# Patient Record
Sex: FEMALE | Race: WHITE
[De-identification: names, ages, dates, MRNs, and addresses within clinical notes are randomized per-mention and may not be internally consistent; named-entity substitution may affect disease eponyms.]

---

## 2017-08-16 ENCOUNTER — HOSPITAL ENCOUNTER (OUTPATIENT)
Dept: HOSPITAL 45 - C.LAB1850 | Age: 47
Discharge: HOME | End: 2017-08-16
Attending: NURSE PRACTITIONER
Payer: COMMERCIAL

## 2017-08-16 DIAGNOSIS — I10: ICD-10-CM

## 2017-08-16 DIAGNOSIS — Z13.220: Primary | ICD-10-CM

## 2017-08-16 DIAGNOSIS — R53.83: ICD-10-CM

## 2017-08-16 DIAGNOSIS — Z13.1: ICD-10-CM

## 2017-08-16 LAB
ANION GAP SERPL CALC-SCNC: 4 MMOL/L (ref 3–11)
APPEARANCE UR: CLEAR
BILIRUB UR-MCNC: (no result) MG/DL
BUN SERPL-MCNC: 15 MG/DL (ref 7–18)
BUN/CREAT SERPL: 15.5 (ref 10–20)
CALCIUM SERPL-MCNC: 9.4 MG/DL (ref 8.5–10.1)
CHLORIDE SERPL-SCNC: 105 MMOL/L (ref 98–107)
CHOLEST/HDLC SERPL: 3 {RATIO}
CO2 SERPL-SCNC: 26 MMOL/L (ref 21–32)
COLOR UR: YELLOW
CREAT SERPL-MCNC: 0.99 MG/DL (ref 0.6–1.2)
EOSINOPHIL NFR BLD AUTO: 234 K/UL (ref 130–400)
GLUCOSE SERPL-MCNC: 82 MG/DL (ref 70–99)
GLUCOSE UR QL: 58 MG/DL
HCT VFR BLD CALC: 44.7 % (ref 37–47)
KETONES UR QL STRIP: 99 MG/DL
MANUAL MICROSCOPIC REQUIRED?: NO
MCH RBC QN AUTO: 29.7 PG (ref 25–34)
MCHC RBC AUTO-ENTMCNC: 33.1 G/DL (ref 32–36)
MCV RBC AUTO: 89.8 FL (ref 80–100)
NITRITE UR QL STRIP: (no result)
NITRITE UR QL STRIP: 74 MG/DL (ref 0–150)
PH UR STRIP: 7 [PH] (ref 4.5–7.5)
PH UR: 172 MG/DL (ref 0–200)
PMV BLD AUTO: 11.9 FL (ref 7.4–10.4)
POTASSIUM SERPL-SCNC: 3.8 MMOL/L (ref 3.5–5.1)
RBC # BLD AUTO: 4.98 M/UL (ref 4.2–5.4)
REVIEW REQ?: NO
SODIUM SERPL-SCNC: 135 MMOL/L (ref 136–145)
SP GR UR STRIP: 1.01 (ref 1–1.03)
TSH SERPL-ACNC: 1.05 UIU/ML (ref 0.3–4.5)
URINE EPITHELIAL CELL AUTO: (no result) /LPF (ref 0–5)
UROBILINOGEN UR-MCNC: (no result) MG/DL
VERY LOW DENSITY LIPOPROT CALC: 15 MG/DL
WBC # BLD AUTO: 8.59 K/UL (ref 4.8–10.8)

## 2018-03-30 ENCOUNTER — HOSPITAL ENCOUNTER (OUTPATIENT)
Dept: HOSPITAL 45 - X.SURG | Age: 48
Discharge: HOME | End: 2018-03-30
Attending: SURGERY
Payer: COMMERCIAL

## 2018-03-30 VITALS
HEIGHT: 65.98 IN | HEIGHT: 65.98 IN | BODY MASS INDEX: 25.28 KG/M2 | WEIGHT: 157.32 LBS | WEIGHT: 157.32 LBS | BODY MASS INDEX: 25.28 KG/M2

## 2018-03-30 VITALS — DIASTOLIC BLOOD PRESSURE: 85 MMHG | OXYGEN SATURATION: 99 % | SYSTOLIC BLOOD PRESSURE: 151 MMHG | HEART RATE: 65 BPM

## 2018-03-30 VITALS — TEMPERATURE: 98.42 F

## 2018-03-30 DIAGNOSIS — F41.8: ICD-10-CM

## 2018-03-30 DIAGNOSIS — Z79.899: ICD-10-CM

## 2018-03-30 DIAGNOSIS — L72.11: Primary | ICD-10-CM

## 2018-03-30 DIAGNOSIS — Z98.51: ICD-10-CM

## 2018-03-30 DIAGNOSIS — Z83.49: ICD-10-CM

## 2018-03-30 DIAGNOSIS — Z82.49: ICD-10-CM

## 2018-03-30 DIAGNOSIS — K21.9: ICD-10-CM

## 2018-03-30 DIAGNOSIS — I10: ICD-10-CM

## 2018-03-30 NOTE — [facility] OPERATIVE REPORT
Operative Report


Operative Date


Mar 30, 2018.





Pre-Operative Diagnosis





Scalp masses X2





Post-Operative Diagnosis





Same as pre-op





Procedure(s) Performed





Excision Scalp Masses X2 (1cm and 1cm)





Surgeon








Assistant Surgeon(s)


None





Estimated Blood Loss


1ML





Findings


1cm pilar cysts excised, superficial to galea





Specimens





A.Midline scalp mass





B.Right side scalp mass





Drains


None





Anesthesia Type


MAC





Complication(s)


none





Disposition


no


Recovery Room / PACU





Indications


47-year-old female with soft tissue masses to her scalp, likely pilar cysts.  

Plan for excision of scalp masses 2.  The risks of the procedure were discussed

, all questions were answered, and the patient agreed to proceed with surgery 

as planned.





Description of Procedure


The patient was properly identified, consented, and taken to the operating room 

where she was placed in the supine position.  Moderate anesthesia care was 

induced.  SCDs and a safety belt were placed.  The areas overlying the mass is 

in the midline scalp and right temporal area and a small amount of hair shaved 

overlying the mass.  Surgical lube was used to keep the hair out of the 

surgical field.  The patient's scalp was prepped and draped in the standard 

sterile fashion.  Surgical timeout was performed and all parties were in 

agreement that this was the correct patient and procedure to be performed and 

we continued as planned.  





Local anesthetic was injected along the skin incision.  An elliptical incision 

was made overlying the mass in the midline scalp and deepened down through the 

subcutaneous tissue with electrocautery.  The mass was circumferentially 

dissected, excised, and passed off the table as specimen, and appear to be a 1 

cm pilar cyst that was superficial to the galea.  The wound was irrigated and 

hemostasis was confirmed.  The skin was closed with interrupted 4-0 Prolene 

simple suture.  Bacitracin was placed over the wound.  





Attention then turned to the right sided scalp mass.  An elliptical incision 

was made overlying the mass and encompassing the central punctum was clearly 

visible.  The mass was circumferentially dissected, excised, and passed off the 

table specimen, and appeared to be a 1 cm pilar cyst that was superficial to 

the galea was more inflamed than the other excision.  The wound was irrigated 

and hemostasis was confirmed.  The skin was closed with interrupted 4-0 Prolene 

simple sutures.  Bacitracin was placed over the wound.





Anesthesia was ceased in the operating room and taken to the PACU where she 

recovered without apparent incident.  All sponge, instrument and needle counts 

were correct at the conclusion of the procedure.  The patient tolerated the 

procedure well.


I attest to the content of the Intraoperative Record and any orders documented 

therein.  Any exceptions are noted below.

## 2018-03-30 NOTE — [facility] POST OPERATIVE BRIEF NOTE
Immediate Operative Summary


Operative Date


Mar 30, 2018.





Pre-Operative Diagnosis





Scalp masses X2





Post-Operative Diagnosis





Same as pre-op





Procedure(s) Performed





Excision Scalp Masses X2 (1cm and 1cm)





Surgeon








Assistant Surgeon(s)


None





Estimated Blood Loss


1ML





Findings


Consistent with Post-Op Diagnosis


1cm pilar cysts excised, superficial to galea





Specimens





A.Midline scalp mass





B.Right side scalp mass





Drains


None





Anesthesia Type


MAC





Complication(s)


none





Disposition


Accompanied Pt To Recovery:  no


Disposition:  Recovery Room / PACU

## 2018-03-30 NOTE — DISCHARGE INSTRUCTIONS
Discharge Instructions


Date of Service


Mar 30, 2018.





Visit


Reason for Visit:  Scalp Masses X2





Discharge


Discharge Diagnosis / Problem:  excision scalp mass





Discharge Goals


Goal(s):  Decrease discomfort





Activity Recommendations


Activity Limitations:  resume your previous activity


Shower/Bathe:  tomorrow





Anesthesia


.





Post Anesthesia Instructions:





If you have had General Anesthesia or IV Sedation:





*  Do not drive today.


*  Resume driving when surgeon permits.


*  Do not make important decisions or sign legal documents today.


*  Call surgeon for:





   1.  Temperature elevations greater than 101 degrees F.


   2.  Uncontrollable pain.


   3.  Excessive bleeding.


   4.  Persistent nausea and vomiting.


   5.  Medication intolerance (nausea, vomiting or rash).





*  For nausea and vomiting use only clear liquids such as: tea, soda, bouillon 

until nausea subsides, then gradually increase diet as tolerated.





*  If you have any concerns or questions, call your surgeon's office.  If 

physician is unavailable and it is an emergency, call 911 or go to the nearest 

emergency room.





.





Instructions / Follow-Up


Instructions / Follow-Up


Dr. Arreola in approx 1 week as planned or call [phone] if you do not already 

have an appt or have any questions


You may take ibuprofen 600 mg every 6 hours as needed for pain or Tylenol OTC 

as directed





Diet Recommendations


Recommended Home Diet:  no limitations





Pending Studies


Studies pending at discharge:  yes


List of pending studies:  


pathology





Medical Emergencies








.


Who to Call and When:





Medical Emergencies:  If at any time you feel your situation is an emergency, 

please call 911 immediately.





.





Non-Emergent Contact


Non-Emergency issues call your:  Surgeon


Call Non-Emergent contact if:  you have a fever, temperature is above 101.5, 

your pain is not controlled, wound has increased redness, you have any 

medication questions





.


.








"Provider Documentation" section prepared by Lewis Hutchison.








.

## 2018-05-24 ENCOUNTER — HOSPITAL ENCOUNTER (EMERGENCY)
Dept: HOSPITAL 45 - C.EDB | Age: 48
LOS: 1 days | Discharge: HOME | End: 2018-05-25
Payer: COMMERCIAL

## 2018-05-24 VITALS
WEIGHT: 163.14 LBS | BODY MASS INDEX: 26.22 KG/M2 | HEIGHT: 65.98 IN | HEIGHT: 65.98 IN | WEIGHT: 163.14 LBS | BODY MASS INDEX: 26.22 KG/M2

## 2018-05-24 VITALS — TEMPERATURE: 97.7 F
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
## 2018-05-24 DIAGNOSIS — Z79.899: ICD-10-CM

## 2018-05-24 DIAGNOSIS — M79.644: Primary | ICD-10-CM

## 2018-05-24 DIAGNOSIS — I10: ICD-10-CM

## 2018-05-24 NOTE — EMERGENCY ROOM VISIT NOTE
History


First contact with patient:  23:27


Chief Complaint:  HAND PAIN/INJURY


Stated Complaint:  RIGHT HAND MIDDLE FINGER INJURY





History of Present Illness


The patient is a 47 year old female who presents to the Emergency Room via 

private vehicle with complaints of "right hand, middle finger injury".  The 

patient states that she is right-hand dominant, and earlier today around 7:30 

AM she was on doing a bungee cord when she felt a pop in the right hand on the 

flexor aspect of the right third digit and then had pain radiating to the 

dorsal base.  She rates the pain as a 7/10.  She denies any numbness or 

tingling.  She notes that she tried to ice the region and took ibuprofen with 

minimal relief.





Review of Systems


A complete 6-point Review of Systems was discussed with the patient, with 

pertinent positives and negatives listed in the History of Present Illness. All 

remaining Review of Systems questions can be considered negative unless 

otherwise specified.





Past Medical/Surgical History


Medical Problems:


(1) Hx of migraines


(2) Hypertension


(3) Kidney stone








Family History





No pertinent family history





Social History


Smoking Status:  Never Smoker


Alcohol Use:  occasionally


Drug Use:  none


Marital Status:  , in relationship


Housing Status:  lives with family


Occupation Status:  employed





Current/Historical Medications


Scheduled


Amlodipine (Norvasc), 5 MG PO QAM


Bupropion (Wellbutrin Sr), 100 MG PO QAM


Topiramate (Topamax), 100 MG PO QPM


Topiramate (Topamax), 50 MG PO QAM





Physical Exam


Vital Signs











  Date Time  Temp Pulse Resp B/P (MAP) Pulse Ox O2 Delivery O2 Flow Rate FiO2


 


5/25/18 00:09  60 20 145/80 98 Room Air  


 


5/24/18 23:24 36.5 63 20 149/86 98 Room Air  











Physical Exam


VITAL SIGNS - Vital signs and nursing notes were reviewed.  Stable.  Afebrile.


GENERAL -47-year-old female appearing her stated age who is in no acute 

distress. Communicates well with provider and answers questions appropriately.


SKIN -no ecchymosis.  There is edema and erythema overlying the dorsal aspect 

of the right MCP joint.  No deformity noted.


EXTREMITIES -the patient's right hand is unremarkable to inspection other than 

edema and erythema overlying the right third dorsal aspect of the MCP joint.  

There is tenderness to palpation overlying the flexor aspect of the patient's 

right PIP and DIP joint extending to the volar MCP joint region.  Full strength 

with flexion and extension was elicited against resistance of the right third 

digit.  No strength deficit.  No palpable tear/rolled tendon or ligament.  No 

clicking.  No crepitus.  She is neurovascularly intact in this region with 

excellent distal capillary refill.





Medical Decision & Procedures


ER Provider


Diagnostic Interpretation:





R HAND MIN 3 VIEWS ROUTINE





CLINICAL HISTORY: Right hand pain     





COMPARISON: None.





DISCUSSION: No acute fractures or dislocations are visualized. There are no


erosive or destructive changes.    





IMPRESSION: No acute fractures. No evidence of erosive disease.











Electronically signed by:  Wei Ferreira M.D.


5/25/2018 6:50 AM





Dictated Date/Time:  5/25/2018 6:49 AM





Medical Decision


Patient was seen and evaluated as above in room be 11. Review was performed of 

nursing notes and vital signs. After obtaining a thorough history and physical 

examination the above work up was performed.  X-ray obtained secondary to her 

presentation with right third digit pain extending into the MCP joint.  This 

was found to be negative.  I suspect she likely is experiencing a tendon or 

ligamentous injury.  She will be placed in a finger splint with slight flexion 

secondary to the pain being on the flexor aspect.  She is to follow with 

orthopedics or return with worsening.  Work excuse provided.  The patient was 

educated upon management, had questions answered prior to discharge, and was 

discharged home in good condition.





In the evaluation and treatment of this patient, the following differential 

diagnoses were considered:


Finger Fracture, Finger Dislocation, Finger Sprain, Finger Contusion, Jersey 

Finger, or Mallet Finger.





Impression





 Primary Impression:  


 Finger pain, right





Departure Information


Dispostion


Home / Self-Care





Condition


GOOD





Referrals


Anisa Merino (PCP)








Yosvany Byers M.D.





Patient Instructions


[facility]





Additional Instructions





You have been treated in the Emergency Department for Finger Pain. 





For pain control, you can use the following over-the-counter medicines:





- Regular strength (325mg/tab) Tylenol (acetaminophen) 2 tabs every 4-6 hours 

as needed. Do not exceed 12 tablets in a 24 hour period. Avoid taking more than 

3 grams (3000 mg) of Tylenol per day. This includes any other sources of 

acetaminophen you may take on a regular basis.





- Regular strength (200 mg/tab) Advil (ibuprofen) 1-2 tabs every 4-6 hours as 

needed. Do not exceed a dose of 3200 mg per day.





If this is a recent injury (<24 hrs), ice can be applied to the area of pain 

for the first 3 days to help decrease pain and inflammation.





You have been provided the number for an Orthopaedic Surgeon. You should call 

this number as soon as possible to establish a follow-up visit from today's 

Emergency Department visit.





Keep the brace/splint in place until evaluated by Orthopedics.





Return to the Emergency Department if your current symptoms worsen despite 

treatment course outlined above, or if you develop any of the following symptoms

: intractable pain despite aforementioned treatment course or new onset of 

numbness or tingling of the fingers.

## 2018-05-25 VITALS — HEART RATE: 60 BPM | OXYGEN SATURATION: 98 % | SYSTOLIC BLOOD PRESSURE: 145 MMHG | DIASTOLIC BLOOD PRESSURE: 80 MMHG

## 2018-05-25 NOTE — DIAGNOSTIC IMAGING REPORT
R HAND MIN 3 VIEWS ROUTINE



CLINICAL HISTORY: Right hand pain     



COMPARISON: None.



DISCUSSION: No acute fractures or dislocations are visualized. There are no

erosive or destructive changes.    



IMPRESSION: No acute fractures. No evidence of erosive disease.







Electronically signed by:  Wei Ferreira M.D.

5/25/2018 6:50 AM



Dictated Date/Time:  5/25/2018 6:49 AM

## 2018-08-22 ENCOUNTER — HOSPITAL ENCOUNTER (OUTPATIENT)
Dept: HOSPITAL 45 - C.LAB1850 | Age: 48
Discharge: HOME | End: 2018-08-22
Attending: FAMILY MEDICINE
Payer: COMMERCIAL

## 2018-08-22 DIAGNOSIS — Z13.1: Primary | ICD-10-CM

## 2018-08-22 DIAGNOSIS — Z13.220: ICD-10-CM

## 2018-08-22 LAB
GLUCOSE SERPL-MCNC: 81 MG/DL (ref 70–99)
KETONES UR QL STRIP: 85 MG/DL
PH UR: 164 MG/DL (ref 0–200)